# Patient Record
(demographics unavailable — no encounter records)

---

## 2025-04-07 NOTE — REVIEW OF SYSTEMS
[Nausea] : nausea [Fever] : no fever [Chills] : no chills [Sore Throat] : no sore throat [Chest Pain] : no chest pain [Palpitations] : no palpitations [Shortness Of Breath] : no shortness of breath [Cough] : no cough [Abdominal Pain] : no abdominal pain [Constipation] : no constipation [Diarrhea] : no diarrhea [Vomiting] : no vomiting [Melena] : no melena [Dysuria] : no dysuria [Hematuria] : no hematuria [Headache] : no headache [Fainting] : no fainting [Suicidal] : not suicidal [Swollen Glands] : no swollen glands [FreeTextEntry8] : + testicular lesion, otherwise no genital lesions/discharge

## 2025-04-07 NOTE — HEALTH RISK ASSESSMENT
[0] : 2) Feeling down, depressed, or hopeless: Not at all (0) [PHQ-2 Negative - No further assessment needed] : PHQ-2 Negative - No further assessment needed [Never] : Never [[id number]] : 0

## 2025-04-07 NOTE — HISTORY OF PRESENT ILLNESS
[FreeTextEntry1] : establishing care PReP PHQ2:0 [de-identified] : # PrEP - here to talk about apretude - intolerant of oral PrEP- truvada had nausea/vomiting (better w 2-1-1 dosing but still present, didn't tolerate daily), descovy had significant dizziness - never had any of these symptoms aside from right after taking PrEP - currently on 2-1-1 PrEP started yesterday before sex  - prior to this had sex 2 weeks ago, also used 2-1-1  - last HIV test neg last Fall, thinks 10/2024 at clinic in [place] - has contd truvada 2-1-1 prn since then  # New patient - no other PMH/meds/allergies - PSH wisdom teeth  - works in operations management [employer]  - moved from [place] to [place] in feb 2025 for work  # L Testicular Lesion - chronic, occasionally painful - never had US/testing - first noted   # L Knee Pain - initial injury after jumping on trampoline 15 years ago  - at time had severe lateral knee pain and says he popped his fibula back into place - did PT, never had imaging, symptoms slowly improving over years but still present on lateral side thiago if days of lots of weight bearing - walks 3 miles daily at gym  # HCM - moved here w , sexually active w , open - thinks covid/flu/hpv/tdap UTD, mening not UTD - no smoking hx

## 2025-04-07 NOTE — PHYSICAL EXAM
[No Acute Distress] : no acute distress [Well-Appearing] : well-appearing [Normal Outer Ear/Nose] : the outer ears and nose were normal in appearance [Normal Oropharynx] : the oropharynx was normal [No Lymphadenopathy] : no lymphadenopathy [Supple] : supple [No Respiratory Distress] : no respiratory distress  [No Accessory Muscle Use] : no accessory muscle use [Clear to Auscultation] : lungs were clear to auscultation bilaterally [Normal Rate] : normal rate  [Regular Rhythm] : with a regular rhythm [Normal S1, S2] : normal S1 and S2 [No Murmur] : no murmur heard [Soft] : abdomen soft [Non Tender] : non-tender [Non-distended] : non-distended [Normal Posterior Cervical Nodes] : no posterior cervical lymphadenopathy [Normal Anterior Cervical Nodes] : no anterior cervical lymphadenopathy [No Focal Deficits] : no focal deficits [Normal Affect] : the affect was normal [Normal Insight/Judgement] : insight and judgment were intact [de-identified] : declines testicular exam

## 2025-04-30 NOTE — HISTORY OF PRESENT ILLNESS
[FreeTextEntry1] : PReP f/u  [de-identified] : # PrEP - here to start vocabria; since last visit has contd truvada 2-1-1 to cover all potential exposures - all partners since last testing were also HIV neg on PrEP - stable GI side effects on truvada - will be out of country 6[place]17-7/03 - feels well, no acute symptoms, no s/s acute HIV

## 2025-04-30 NOTE — REVIEW OF SYSTEMS
[Fever] : no fever [Chills] : no chills [Sore Throat] : no sore throat [Abdominal Pain] : no abdominal pain [Constipation] : no constipation [Diarrhea] : no diarrhea [Skin Rash] : no skin rash [Swollen Glands] : no swollen glands maximum assist (25% patients effort)

## 2025-06-05 NOTE — HISTORY OF PRESENT ILLNESS
[FreeTextEntry1] : prep follow up  [de-identified] : 30 y/o MSM (on PrEP) with PMHx of obesity (BMI ~41), presenting for follow-up visit regarding PrEP.  Pt previously did not tolerate truvada or descovy due to GI side effects. Was prescribed vocabria to assess for cabotegravir tolerability prior to starting apretude. He has been taking it for 1 month and reports 1 episode of vomiting that occurred after a few weeks, however he attributes the vomiting to a bad breakfast burrito and not the vocabria. Reports 2 missed doses that were not back to back. Otherwise tolerated the vocabria very well. No other complaints at this time ROS negative.

## 2025-06-05 NOTE — ASSESSMENT
[FreeTextEntry1] : 28 y/o MSM (on PrEP) with PMHx of obesity (BMI ~41), presenting for follow-up visit regarding PrEP.

## 2025-06-05 NOTE — END OF VISIT
[] : Resident [FreeTextEntry3] : # PrEP- since last visit switched on demand truvada to Vocabria to trial side effects prior to switch to Apretude. Tolerated well without side effects. Two missed doses (not back to back). Feels well w/o s/s acute HIV. All interval sexual partners HIV negative on PrEP. 1st apretude shot given today, side effects and mgmt. of injection site pain re-reviewed. Repeat HIV testing w STI screening, doxypep refilled. F/u 7/07 for second shot, confirmed pt will be in town to attend visit.

## 2025-06-05 NOTE — REVIEW OF SYSTEMS
[Fever] : no fever [Chills] : no chills [Chest Pain] : no chest pain [Shortness Of Breath] : no shortness of breath [Abdominal Pain] : no abdominal pain [Nausea] : no nausea [Diarrhea] : no diarrhea [Vomiting] : no vomiting [Headache] : no headache

## 2025-06-05 NOTE — HISTORY OF PRESENT ILLNESS
[FreeTextEntry1] : prep follow up  [de-identified] : 30 y/o MSM (on PrEP) with PMHx of obesity (BMI ~41), presenting for follow-up visit regarding PrEP.  Pt previously did not tolerate truvada or descovy due to GI side effects. Was prescribed vocabria to assess for cabotegravir tolerability prior to starting apretude. He has been taking it for 1 month and reports 1 episode of vomiting that occurred after a few weeks, however he attributes the vomiting to a bad breakfast burrito and not the vocabria. Reports 2 missed doses that were not back to back. Otherwise tolerated the vocabria very well. No other complaints at this time ROS negative.

## 2025-06-05 NOTE — ASSESSMENT
[FreeTextEntry1] : 30 y/o MSM (on PrEP) with PMHx of obesity (BMI ~41), presenting for follow-up visit regarding PrEP.

## 2025-07-07 NOTE — HISTORY OF PRESENT ILLNESS
[FreeTextEntry1] : Prep follow up [de-identified] : # PrEP - tolerated 1st apretude shot well- pain and bruising at site for a few days then resolved - otherwise feeling well, no systemic side effects, likes this method  - one new partner since last visit  - no s/s acute HIV - hoping to resume care w therapy- prior therapist moved out of country